# Patient Record
Sex: MALE | Race: WHITE
[De-identification: names, ages, dates, MRNs, and addresses within clinical notes are randomized per-mention and may not be internally consistent; named-entity substitution may affect disease eponyms.]

---

## 2022-06-13 NOTE — PROGRESS NOTES
Valerie Mayorga (:  1990) is a 29 y.o. male,New patient, here for evaluation of the following chief complaint(s):  Establish Care, Rash (spots on legs. No itching. ), and Psoriasis (would like Rx for cream to help with this. )         ASSESSMENT/PLAN:  1. Psoriasis  -     augmented betamethasone dipropionate (DIPROLENE AF) 0.05 % cream; Apply topically 2 times daily Apply topically 2 times daily. , Topical, 2 TIMES DAILY Starting Mon 2022, Disp-60 g, R-3, Normal  2. Need for hepatitis C screening test  -     Hepatitis C Antibody; Future  3. Encounter for screening for HIV  -     HIV 1/2 Ag/Ab, 4TH Generation,W Rflx Confirm; Future  4. Screening, ischemic heart disease  -     CBC with Auto Differential; Future  -     Comprehensive Metabolic Panel; Future  -     Lipid Panel; Future  -     Urinalysis with Reflex to Culture; Future  5. Screening for thyroid disorder  -     TSH; Future  6. Screening for diabetes mellitus  -     Comprehensive Metabolic Panel; Future  -     Hemoglobin A1C; Future  7. Chronic diarrhea  -     Culture, Stool; Future  -     Clostridium Difficile Toxin/Antigen; Future  -     Gastrointestinal Panel, Molecular; Future  -     Ova and Parasite Examination; Future  8. Hemorrhoids, unspecified hemorrhoid type  -     hydrocortisone (ANUSOL-HC) 2.5 % CREA rectal cream; Place rectally 2 times daily, Rectal, 2 TIMES DAILY Starting Mon 2022, Disp-1 each, R-1, Normal  9. Postprandial RUQ pain  -     US GALLBLADDER RUQ; Future  10. Intrinsic atopic dermatitis  -     augmented betamethasone dipropionate (DIPROLENE AF) 0.05 % cream; Apply topically 2 times daily Apply topically 2 times daily. , Topical, 2 TIMES DAILY Starting Mon 2022, Disp-60 g, R-3, Normal  11. Marijuana use  1. Psoriasis/atopic dermatitis. Patient given topical steroids. Not severe enough to be referred to dermatologist.  2.  Chronic diarrhea with right upper quadrant pain. Will do ultrasound of gallbladder.   We will do stool studies. 3.  Hemorrhoids. Given Anusol HC. 4.  Recreational marijuana use. 5.  Patient scheduled for blood work. Return in about 3 weeks (around 7/11/2022). Subjective   SUBJECTIVE/OBJECTIVE:  82-year-old male comes in to establish. Patient complains of  1. Rash on legs no itching. Eczematous rash on lower extremities with scaling. 2.  Psoriasis. Requesting cream for this. Bothers every couple of months. No lesion present time. 3.  History of bipolar depression. Not taking any meds. Doing well at the present time. 4.  History of alcohol and drug abuse. use marjuana recreational. Hx of opiates and alcohol abuse. At present not taking any opiates. Only drinks alcohol 3 times a year. 5. Complains of stomach issues. Has hemorrhoids. Diarrhea once a day. occasional blood from hemorrhoids. Some cramping. No food association. Patient says diarrhea switched with greasy foods. Has pain in the right upper quadrant. Review of Systems   Gastrointestinal: Positive for abdominal pain, anal bleeding and diarrhea. Objective   Physical Exam  Constitutional:       Appearance: Normal appearance. He is normal weight. Cardiovascular:      Rate and Rhythm: Normal rate and regular rhythm. Heart sounds: Normal heart sounds. Pulmonary:      Effort: Pulmonary effort is normal.      Breath sounds: Normal breath sounds. Abdominal:      General: Abdomen is flat. Bowel sounds are normal.      Palpations: Abdomen is soft. Tenderness: There is abdominal tenderness. Comments: ruq tenderness   Musculoskeletal:      Right lower leg: No edema. Left lower leg: No edema. Skin:     Comments: Atopic dermatitis noted on legs   Neurological:      General: No focal deficit present. Mental Status: He is alert. Psychiatric:         Mood and Affect: Mood normal.                  An electronic signature was used to authenticate this note.     --Jacki Lam MD

## 2022-06-20 ENCOUNTER — OFFICE VISIT (OUTPATIENT)
Dept: INTERNAL MEDICINE CLINIC | Facility: CLINIC | Age: 32
End: 2022-06-20
Payer: COMMERCIAL

## 2022-06-20 VITALS
TEMPERATURE: 97.7 F | SYSTOLIC BLOOD PRESSURE: 115 MMHG | HEART RATE: 71 BPM | WEIGHT: 131 LBS | HEIGHT: 67 IN | DIASTOLIC BLOOD PRESSURE: 74 MMHG | BODY MASS INDEX: 20.56 KG/M2 | OXYGEN SATURATION: 98 %

## 2022-06-20 DIAGNOSIS — L40.9 PSORIASIS: Primary | ICD-10-CM

## 2022-06-20 DIAGNOSIS — F12.90 MARIJUANA USE: ICD-10-CM

## 2022-06-20 DIAGNOSIS — Z13.6 SCREENING, ISCHEMIC HEART DISEASE: ICD-10-CM

## 2022-06-20 DIAGNOSIS — K52.9 CHRONIC DIARRHEA: ICD-10-CM

## 2022-06-20 DIAGNOSIS — L20.84 INTRINSIC ATOPIC DERMATITIS: ICD-10-CM

## 2022-06-20 DIAGNOSIS — R10.11 POSTPRANDIAL RUQ PAIN: ICD-10-CM

## 2022-06-20 DIAGNOSIS — K64.9 HEMORRHOIDS, UNSPECIFIED HEMORRHOID TYPE: ICD-10-CM

## 2022-06-20 DIAGNOSIS — Z13.1 SCREENING FOR DIABETES MELLITUS: ICD-10-CM

## 2022-06-20 DIAGNOSIS — Z11.4 ENCOUNTER FOR SCREENING FOR HIV: ICD-10-CM

## 2022-06-20 DIAGNOSIS — Z13.29 SCREENING FOR THYROID DISORDER: ICD-10-CM

## 2022-06-20 DIAGNOSIS — Z11.59 NEED FOR HEPATITIS C SCREENING TEST: ICD-10-CM

## 2022-06-20 PROBLEM — K14.8 TONGUE LESION: Status: ACTIVE | Noted: 2021-10-22

## 2022-06-20 LAB
ALBUMIN SERPL-MCNC: 4.1 G/DL (ref 3.5–5)
ALBUMIN/GLOB SERPL: 1.5 {RATIO} (ref 1.2–3.5)
ALP SERPL-CCNC: 74 U/L (ref 50–136)
ALT SERPL-CCNC: 41 U/L (ref 12–65)
ANION GAP SERPL CALC-SCNC: 4 MMOL/L (ref 7–16)
APPEARANCE UR: CLEAR
AST SERPL-CCNC: 21 U/L (ref 15–37)
BACTERIA URNS QL MICRO: NEGATIVE /HPF
BASOPHILS # BLD: 0 K/UL (ref 0–0.2)
BASOPHILS NFR BLD: 1 % (ref 0–2)
BILIRUB SERPL-MCNC: 0.5 MG/DL (ref 0.2–1.1)
BILIRUB UR QL: NEGATIVE
BUN SERPL-MCNC: 15 MG/DL (ref 6–23)
CALCIUM SERPL-MCNC: 9.3 MG/DL (ref 8.3–10.4)
CASTS URNS QL MICRO: ABNORMAL /LPF
CHLORIDE SERPL-SCNC: 108 MMOL/L (ref 98–107)
CHOLEST SERPL-MCNC: 141 MG/DL
CO2 SERPL-SCNC: 29 MMOL/L (ref 21–32)
COLOR UR: ABNORMAL
CREAT SERPL-MCNC: 0.9 MG/DL (ref 0.8–1.5)
DIFFERENTIAL METHOD BLD: NORMAL
EOSINOPHIL # BLD: 0.2 K/UL (ref 0–0.8)
EOSINOPHIL NFR BLD: 2 % (ref 0.5–7.8)
EPI CELLS #/AREA URNS HPF: ABNORMAL /HPF
ERYTHROCYTE [DISTWIDTH] IN BLOOD BY AUTOMATED COUNT: 12.8 % (ref 11.9–14.6)
EST. AVERAGE GLUCOSE BLD GHB EST-MCNC: 100 MG/DL
GLOBULIN SER CALC-MCNC: 2.7 G/DL (ref 2.3–3.5)
GLUCOSE SERPL-MCNC: 83 MG/DL (ref 65–100)
GLUCOSE UR STRIP.AUTO-MCNC: NEGATIVE MG/DL
HBA1C MFR BLD: 5.1 % (ref 4.2–6.3)
HCT VFR BLD AUTO: 45.6 % (ref 41.1–50.3)
HCV AB SER QL: NONREACTIVE
HDLC SERPL-MCNC: 48 MG/DL (ref 40–60)
HDLC SERPL: 2.9 {RATIO}
HGB BLD-MCNC: 14.7 G/DL (ref 13.6–17.2)
HGB UR QL STRIP: NEGATIVE
HIV 1+2 AB+HIV1 P24 AG SERPL QL IA: NONREACTIVE
HIV 1/2 RESULT COMMENT: NORMAL
IMM GRANULOCYTES # BLD AUTO: 0 K/UL (ref 0–0.5)
IMM GRANULOCYTES NFR BLD AUTO: 0 % (ref 0–5)
KETONES UR QL STRIP.AUTO: NEGATIVE MG/DL
LDLC SERPL CALC-MCNC: 61.2 MG/DL
LEUKOCYTE ESTERASE UR QL STRIP.AUTO: NEGATIVE
LYMPHOCYTES # BLD: 1.8 K/UL (ref 0.5–4.6)
LYMPHOCYTES NFR BLD: 29 % (ref 13–44)
MCH RBC QN AUTO: 31 PG (ref 26.1–32.9)
MCHC RBC AUTO-ENTMCNC: 32.2 G/DL (ref 31.4–35)
MCV RBC AUTO: 96.2 FL (ref 79.6–97.8)
MONOCYTES # BLD: 0.6 K/UL (ref 0.1–1.3)
MONOCYTES NFR BLD: 9 % (ref 4–12)
MUCOUS THREADS URNS QL MICRO: 0 /LPF
NEUTS SEG # BLD: 3.7 K/UL (ref 1.7–8.2)
NEUTS SEG NFR BLD: 59 % (ref 43–78)
NITRITE UR QL STRIP.AUTO: NEGATIVE
NRBC # BLD: 0 K/UL (ref 0–0.2)
PH UR STRIP: 5.5 [PH] (ref 5–9)
PLATELET # BLD AUTO: 235 K/UL (ref 150–450)
PMV BLD AUTO: 10.8 FL (ref 9.4–12.3)
POTASSIUM SERPL-SCNC: 4.4 MMOL/L (ref 3.5–5.1)
PROT SERPL-MCNC: 6.8 G/DL (ref 6.3–8.2)
PROT UR STRIP-MCNC: NEGATIVE MG/DL
RBC # BLD AUTO: 4.74 M/UL (ref 4.23–5.6)
RBC #/AREA URNS HPF: ABNORMAL /HPF
SODIUM SERPL-SCNC: 141 MMOL/L (ref 136–145)
SP GR UR REFRACTOMETRY: 1.02 (ref 1–1.02)
TRIGL SERPL-MCNC: 159 MG/DL (ref 35–150)
TSH, 3RD GENERATION: 1.69 UIU/ML (ref 0.36–3.74)
URINE CULTURE IF INDICATED: ABNORMAL
UROBILINOGEN UR QL STRIP.AUTO: 0.2 EU/DL (ref 0.2–1)
VLDLC SERPL CALC-MCNC: 31.8 MG/DL (ref 6–23)
WBC # BLD AUTO: 6.3 K/UL (ref 4.3–11.1)
WBC URNS QL MICRO: ABNORMAL /HPF

## 2022-06-20 PROCEDURE — 99204 OFFICE O/P NEW MOD 45 MIN: CPT | Performed by: FAMILY MEDICINE

## 2022-06-20 RX ORDER — BETAMETHASONE DIPROPIONATE 0.5 MG/G
CREAM TOPICAL 2 TIMES DAILY
Qty: 60 G | Refills: 3 | Status: SHIPPED | OUTPATIENT
Start: 2022-06-20

## 2022-06-20 RX ORDER — HYDROCORTISONE 25 MG/G
CREAM TOPICAL 2 TIMES DAILY
Qty: 1 EACH | Refills: 1 | Status: SHIPPED | OUTPATIENT
Start: 2022-06-20

## 2022-06-20 ASSESSMENT — ENCOUNTER SYMPTOMS
ANAL BLEEDING: 1
ABDOMINAL PAIN: 1
DIARRHEA: 1

## 2022-06-20 ASSESSMENT — PATIENT HEALTH QUESTIONNAIRE - PHQ9
SUM OF ALL RESPONSES TO PHQ QUESTIONS 1-9: 0
SUM OF ALL RESPONSES TO PHQ QUESTIONS 1-9: 0
SUM OF ALL RESPONSES TO PHQ9 QUESTIONS 1 & 2: 0
SUM OF ALL RESPONSES TO PHQ QUESTIONS 1-9: 0
2. FEELING DOWN, DEPRESSED OR HOPELESS: 0
1. LITTLE INTEREST OR PLEASURE IN DOING THINGS: 0
SUM OF ALL RESPONSES TO PHQ QUESTIONS 1-9: 0

## 2022-06-20 NOTE — PROGRESS NOTES
SUBJECTIVE:   Jaime Bingham is a 29 y.o. male presenting for his annual checkup. 68-year-old male comes in for CPE. Patient complains of  1. Rash on legs no itching. Eczematous rash on lower extremities with scaling. With improved with steroid cream.  2.  Psoriasis. Requesting cream for this. Bothers every couple of months. No lesion present time. 3.  History of bipolar depression. Doing well at the present time. 4.  History of alcohol and drug abuse. use marjuana recreational. Hx of opiates and alcohol abuse. At present not taking any opiates. Only drinks alcohol 3 times a year. 5. Complains of stomach issues. Has hemorrhoids. Diarrhea once a day. occasional blood from hemorrhoids. Some cramping. No food association. Patient says diarrhea worsewith greasy foods. Has pain in the right upper  And lower quadrant. Ultrasound of gallbladder ordered but has not yet been done. Stool studies are all negative. Will refer to gastroenterologist for evaluation. 6.  Patient complains of chronic urinary frequency. Has cut down on his caffeine intake. Urinalysis done was normal.  Sugars are normal.  Does drink a lot of fluids which can explain the urinary frequency. Will observe for now. 7.  Lab work done 6/29/22 stool test essentially normal.  Urinalysis normal.  HIV negative CBC normal CMP essentially normal cholesterol 141 triglycerides 159 LDL 61 HDL 40 TSH normal hemoglobin A1c normal hepatitis C negative    Current Outpatient Medications   Medication Sig Dispense Refill    hydrocortisone (ANUSOL-HC) 2.5 % CREA rectal cream Place rectally 2 times daily 1 each 1    augmented betamethasone dipropionate (DIPROLENE AF) 0.05 % cream Apply topically 2 times daily Apply topically 2 times daily. 60 g 3     No current facility-administered medications for this visit. Allergies: Patient has no known allergies. ROS:  Feeling well. No dyspnea or chest pain on exertion.  No abdominal pain, change in bowel habits, black or bloody stools. No urinary tract or prostatic symptoms. No neurological complaints. OBJECTIVE:   The patient appears well, alert, oriented x 3, in no distress. /81   Pulse 64   Temp 97.7 °F (36.5 °C) (Temporal)   Ht 5' 7\" (1.702 m)   Wt 134 lb 3.2 oz (60.9 kg)   SpO2 99%   BMI 21.02 kg/m²   ENT normal.  Neck supple. No adenopathy or thyromegaly. NETO. Lungs are clear, good air entry, no wheezes, rhonchi or rales. S1 and S2 normal, no murmurs, regular rate and rhythm. Abdomen is soft without tenderness, guarding, mass or organomegaly.  exam: no penile lesions or discharge, no testicular masses or tenderness, no hernias, HERNIA EXAM: no hernias found on exam, TESTICULAR EXAM: normal, no masses, PENIS: normal without lesions or discharge, SCROTUM: normal, no masses. Extremities show no edema, normal peripheral pulses. Neurological is normal without focal findings. ASSESSMENT:   healthy adult male    PLAN:   return for routine annual checkups        ICD-10-CM    1. Annual physical exam  Z00.00    2. Need for Tdap vaccination  Z23 Tdap (ADACEL) 5-2-15.5 LF-MCG/0.5 injection   3. Chronic diarrhea  K52.9 [facility]   4. Hemorrhoids, unspecified hemorrhoid type  K64.9 [facility]   5. Hypertriglyceridemia  E78.1 Comprehensive Metabolic Panel     Lipid Panel   6. Marijuana use  F12.90    7. Postprandial RUQ pain  R10.11    8. Urinary frequency  R35.0 Urinalysis with Reflex to Culture   1. Annual physical.  Given prescription for Tdap vaccine. 2.  Chronic diarrhea. Stool studies normal.  Will refer to gastroenterology. 3.  Hemorrhoids. We will do Epson salts hot sitz bath. Will ask evaluation from gastroenterology. 4.  Hypertriglyceridemia advised fish oil. Will follow. 5.  Marijuana use stable. 6.  Right upper quadrant pain. Await ultrasound. 7.  Urinary frequency.   Urinalysis normal.  Sugars normal.  Will observe for now.  Follow-up 6 months labs prior

## 2022-06-29 DIAGNOSIS — K52.9 CHRONIC DIARRHEA: ICD-10-CM

## 2022-06-30 LAB
C DIFF GDH STL QL: NORMAL
C DIFF TOX A+B STL QL IA: NORMAL
C DIFF TOXIN INTERPRETATION: NORMAL
CLINICAL CONSIDERATION: NORMAL
REFLEX: NORMAL

## 2022-07-02 LAB

## 2022-07-07 LAB
BACTERIA SPEC CULT: NORMAL
O+P SPEC MICRO: NORMAL
O+P STL CONC: NORMAL
SERVICE CMNT-IMP: NORMAL
SPECIMEN SOURCE: NORMAL

## 2022-07-13 ENCOUNTER — OFFICE VISIT (OUTPATIENT)
Dept: INTERNAL MEDICINE CLINIC | Facility: CLINIC | Age: 32
End: 2022-07-13
Payer: COMMERCIAL

## 2022-07-13 VITALS
TEMPERATURE: 97.7 F | WEIGHT: 134.2 LBS | HEART RATE: 64 BPM | DIASTOLIC BLOOD PRESSURE: 81 MMHG | BODY MASS INDEX: 21.06 KG/M2 | SYSTOLIC BLOOD PRESSURE: 126 MMHG | HEIGHT: 67 IN | OXYGEN SATURATION: 99 %

## 2022-07-13 DIAGNOSIS — K52.9 CHRONIC DIARRHEA: ICD-10-CM

## 2022-07-13 DIAGNOSIS — K64.9 HEMORRHOIDS, UNSPECIFIED HEMORRHOID TYPE: ICD-10-CM

## 2022-07-13 DIAGNOSIS — R10.11 POSTPRANDIAL RUQ PAIN: ICD-10-CM

## 2022-07-13 DIAGNOSIS — R35.0 URINARY FREQUENCY: ICD-10-CM

## 2022-07-13 DIAGNOSIS — Z23 NEED FOR TDAP VACCINATION: ICD-10-CM

## 2022-07-13 DIAGNOSIS — Z00.00 ANNUAL PHYSICAL EXAM: Primary | ICD-10-CM

## 2022-07-13 DIAGNOSIS — E78.1 HYPERTRIGLYCERIDEMIA: ICD-10-CM

## 2022-07-13 DIAGNOSIS — F12.90 MARIJUANA USE: ICD-10-CM

## 2022-07-13 PROCEDURE — 99395 PREV VISIT EST AGE 18-39: CPT | Performed by: FAMILY MEDICINE

## 2022-07-13 NOTE — PATIENT INSTRUCTIONS
Patient Education        Hemorrhoids: Care Instructions  Overview     Hemorrhoids are swollen veins that develop in the anal canal. Bleeding during bowel movements, itching, and rectal pain are the most common symptoms. Hemorrhoids can be uncomfortable at times, but rarely are they a seriousproblem. Most of the time, you can treat them with simple changes to your diet and bowel habits. These changes include eating more fiber and not straining to pass stools. Most hemorrhoids don't need surgery or other treatment unless they arevery large and painful or bleed a lot. Follow-up care is a key part of your treatment and safety. Be sure to make and go to all appointments, and call your doctor if you are having problems. It's also a good idea to know your test results and keep alist of the medicines you take. How can you care for yourself at home?  Sit in a few inches of warm water (sitz bath) 3 times a day and after bowel movements. The warm water helps with pain and itching.  Put ice on your anal area several times a day for 10 minutes at a time. Put a thin cloth between the ice and your skin. Follow this by placing a warm, wet towel on the area for another 10 to 20 minutes.  Take pain medicines exactly as directed. ? If the doctor gave you a prescription medicine for pain, take it as prescribed. ? If you are not taking a prescription pain medicine, ask your doctor if you can take an over-the-counter medicine.  Keep the anal area clean, but be gentle. Use water and a fragrance-free soap, or use baby wipes or medicated pads such as Tucks.  Wear cotton underwear and loose clothing to decrease moisture in the anal area.  Eat more fiber. Include foods such as whole-grain breads and cereals, raw vegetables, raw and dried fruits, and beans.  Drink plenty of fluids.  If you have kidney, heart, or liver disease and have to limit fluids, talk with your doctor before you increase the amount of fluids you drink.   Use a stool softener that contains bran or psyllium. You can save money by buying bran or psyllium (available in bulk at most health food stores) and sprinkling it on foods or stirring it into fruit juice. Or you can use a product such as Metamucil or Hydrocil.  Practice healthy bowel habits. ? Go to the bathroom as soon as you have the urge. ? Avoid straining to pass stools. Relax and give yourself time to let things happen naturally. ? Do not hold your breath while passing stools. ? Do not read while sitting on the toilet. Get off the toilet as soon as you have finished.  Take your medicines exactly as prescribed. Call your doctor if you think you are having a problem with your medicine. When should you call for help? Call 911 anytime you think you may need emergency care. For example, call if:     You pass maroon or very bloody stools. Call your doctor now or seek immediate medical care if:     You have increased pain.      You have increased bleeding. Watch closely for changes in your health, and be sure to contact your doctor if:     Your symptoms have not improved after 3 or 4 days. Where can you learn more? Go to https://Versuspe15MinutesNOW.AppCard. org and sign in to your [employer] account. Enter D622 in the [employer] box to learn more about \"Hemorrhoids: Care Instructions. \"     If you do not have an account, please click on the \"Sign Up Now\" link. Current as of: September 8, 2021               Content Version: 13.3  © 2006-2022 Healthwise, Incorporated. Care instructions adapted under license by [facility] ([facility]). If you have questions about a medical condition or this instruction, always ask your healthcare professional. [place] [address] any warranty or liability for your use of this information.

## 2023-01-03 DIAGNOSIS — R35.0 URINARY FREQUENCY: ICD-10-CM

## 2023-01-03 DIAGNOSIS — E78.1 HYPERTRIGLYCERIDEMIA: ICD-10-CM

## 2023-01-03 LAB
ALBUMIN SERPL-MCNC: 3.5 G/DL (ref 3.5–5)
ALBUMIN/GLOB SERPL: 1.2 (ref 0.4–1.6)
ALP SERPL-CCNC: 118 U/L (ref 50–136)
ALT SERPL-CCNC: 101 U/L (ref 12–65)
ANION GAP SERPL CALC-SCNC: 5 MMOL/L (ref 2–11)
APPEARANCE UR: CLEAR
AST SERPL-CCNC: 30 U/L (ref 15–37)
BACTERIA URNS QL MICRO: NEGATIVE /HPF
BILIRUB SERPL-MCNC: 0.3 MG/DL (ref 0.2–1.1)
BILIRUB UR QL: NEGATIVE
BUN SERPL-MCNC: 20 MG/DL (ref 6–23)
CALCIUM SERPL-MCNC: 8.9 MG/DL (ref 8.3–10.4)
CASTS URNS QL MICRO: NORMAL /LPF (ref 0–2)
CHLORIDE SERPL-SCNC: 105 MMOL/L (ref 101–110)
CHOLEST SERPL-MCNC: 208 MG/DL
CO2 SERPL-SCNC: 32 MMOL/L (ref 21–32)
COLOR UR: NORMAL
CREAT SERPL-MCNC: 1 MG/DL (ref 0.8–1.5)
EPI CELLS #/AREA URNS HPF: NORMAL /HPF (ref 0–5)
GLOBULIN SER CALC-MCNC: 3 G/DL (ref 2.8–4.5)
GLUCOSE SERPL-MCNC: 80 MG/DL (ref 65–100)
GLUCOSE UR STRIP.AUTO-MCNC: NEGATIVE MG/DL
HDLC SERPL-MCNC: 62 MG/DL (ref 40–60)
HDLC SERPL: 3.4
HGB UR QL STRIP: NEGATIVE
KETONES UR QL STRIP.AUTO: NEGATIVE MG/DL
LDLC SERPL CALC-MCNC: 94.6 MG/DL
LEUKOCYTE ESTERASE UR QL STRIP.AUTO: NEGATIVE
MUCOUS THREADS URNS QL MICRO: 0 /LPF
NITRITE UR QL STRIP.AUTO: NEGATIVE
PH UR STRIP: 6 (ref 5–9)
POTASSIUM SERPL-SCNC: 4.3 MMOL/L (ref 3.5–5.1)
PROT SERPL-MCNC: 6.5 G/DL (ref 6.3–8.2)
PROT UR STRIP-MCNC: NEGATIVE MG/DL
RBC #/AREA URNS HPF: NORMAL /HPF (ref 0–5)
SODIUM SERPL-SCNC: 142 MMOL/L (ref 133–143)
SP GR UR REFRACTOMETRY: 1.02 (ref 1–1.02)
TRIGL SERPL-MCNC: 257 MG/DL (ref 35–150)
URINE CULTURE IF INDICATED: NORMAL
UROBILINOGEN UR QL STRIP.AUTO: 0.2 EU/DL (ref 0.2–1)
VLDLC SERPL CALC-MCNC: 51.4 MG/DL (ref 6–23)
WBC URNS QL MICRO: NORMAL /HPF (ref 0–4)

## 2023-01-10 NOTE — PROGRESS NOTES
Galen Norwood (:  1990) is a 29 y.o. male,Established patient, here for evaluation of the following chief complaint(s):  6 Month Follow-Up         ASSESSMENT/PLAN:  1. Deviated nasal septum  -     mometasone (NASONEX) 50 MCG/ACT nasal spray; 2 sprays by Each Nostril route daily, Each Nostril, DAILY Starting Mon 2023, Disp-1 each, R-5, Normal  -     [address] [facility], [place]  2. Hemorrhoids, unspecified hemorrhoid type  -     hydrocortisone (ANUSOL-HC) 2.5 % CREA rectal cream; Place rectally 2 times daily, Rectal, 2 TIMES DAILY Starting Mon 2023, Disp-1 each, R-1, Normal  3. Hypertriglyceridemia  4. Allergic rhinitis, unspecified seasonality, unspecified trigger  -     mometasone (NASONEX) 50 MCG/ACT nasal spray; 2 sprays by Each Nostril route daily, Each Nostril, DAILY Starting Mon 2023, Disp-1 each, R-5, Normal  1. Deviated nasal septum. We will change Flonase to Nasonex. We will refer the patient to ENT. 2.  Hemorrhoids. Continue Anusol HC. 3.  Hypertriglyceridemia. We will continue to follow. Advised fish oil over-the-counter. 4.  Allergic rhinitis. Continue Zyrtec. Flonase changed to Nasonex. Return in about 6 months (around 2023) for ffup with labs prior to visit. Subjective   SUBJECTIVE/OBJECTIVE:  63-year-old male comes in for 6 month ffup  Patient complains of  1. Rash on legs no itching. Eczematous rash on lower extremities with scaling. improved with steroid cream.  2.  Psoriasis. Requesting cream for this. Bothers every couple of months. No lesion present time. 3.  History of bipolar depression. Doing well at the present time. Not on any medications. 4.  History of alcohol and drug abuse. Hx of opiates and alcohol abuse. At present not taking any opiates. Only drinks alcohol 3 times a year. Patient says he has stopped using marijuana at this time. 5. Complains of stomach issues. Has hemorrhoids. Diarrhea and abdominal pain better.  After going on low fat diet. Does not desire treatment for hemorrhoids. No constipation. Patient never had ultrasound of right upper quadrant done. Patient also did not go to the gastroenterologist.  Since not bothering him at this time we will hold off any further work-up. 6.   Hx of deviated nasal septum. Always sneezing and coughing. Patient taking Zyrtec and Flonase nasal spray. Has some bleeding noted. We will change Flonase to Nasonex. We will also refer to ENT for deviated nasal septum. Patient was already of surgery because of history of opioid abuse. 7. Low cholesterol diet. Since last visit. LDL normal.  HDL increased.   Triglycerides 267.  8. Lab work done 6/29/22 stool test essentially normal.  Urinalysis normal.  HIV negative CBC normal CMP essentially normal cholesterol 141 triglycerides 159 LDL 61 HDL 40 TSH normal hemoglobin A1c normal hepatitis C negative  Lab work done 1/3/2023 cholesterol 208 LDL 94 triglycerides 257  GFR normal urinalysis normal    Orders Only on 01/03/2023   Component Date Value Ref Range Status    Cholesterol, Total 01/03/2023 208 (A)  <200 MG/DL Final    Comment: Borderline High: 200-239 mg/dL  High: Greater than or equal to 240 mg/dL      Triglycerides 01/03/2023 257 (A)  35 - 150 MG/DL Final    Comment: Borderline High: 150-199 mg/dL, High: 200-499 mg/dL  Very High: Greater than or equal to 500 mg/dL      HDL 01/03/2023 62 (A)  40 - 60 MG/DL Final    LDL Calculated 01/03/2023 94.6  <100 MG/DL Final    Comment: Near Optimal: 100-129 mg/dL  Borderline High: 130-159, High: 160-189 mg/dL  Very High: Greater than or equal to 190 mg/dL      VLDL Cholesterol Calculated 01/03/2023 51.4 (A)  6.0 - 23.0 MG/DL Final    Chol/HDL Ratio 01/03/2023 3.4    Final    Sodium 01/03/2023 142  133 - 143 mmol/L Final    Potassium 01/03/2023 4.3  3.5 - 5.1 mmol/L Final    Chloride 01/03/2023 105  101 - 110 mmol/L Final    CO2 01/03/2023 32  21 - 32 mmol/L Final    Anion Gap 01/03/2023 5 2 - 11 mmol/L Final    Glucose 01/03/2023 80  65 - 100 mg/dL Final    BUN 01/03/2023 20  6 - 23 MG/DL Final    Creatinine 01/03/2023 1.00  0.8 - 1.5 MG/DL Final    Est, Glom Filt Rate 01/03/2023 >60  >60 ml/min/1.73m2 Final    Comment:   Pediatric calculator link: [employer].at. org/professionals/kdoqi/gfr_calculatorped    These results are not intended for use in patients <25years of age. eGFR results are calculated without a race factor using  the 2021 CKD-EPI equation. Careful clinical correlation is recommended, particularly when comparing to results calculated using previous equations. The CKD-EPI equation is less accurate in patients with extremes of muscle mass, extra-renal metabolism of creatinine, excessive creatine ingestion, or following therapy that affects renal tubular secretion.       Calcium 01/03/2023 8.9  8.3 - 10.4 MG/DL Final    Total Bilirubin 01/03/2023 0.3  0.2 - 1.1 MG/DL Final    ALT 01/03/2023 101 (A)  12 - 65 U/L Final    AST 01/03/2023 30  15 - 37 U/L Final    Alk Phosphatase 01/03/2023 118  50 - 136 U/L Final    Total Protein 01/03/2023 6.5  6.3 - 8.2 g/dL Final    Albumin 01/03/2023 3.5  3.5 - 5.0 g/dL Final    Globulin 01/03/2023 3.0  2.8 - 4.5 g/dL Final    Albumin/Globulin Ratio 01/03/2023 1.2  0.4 - 1.6   Final    Color, UA 01/03/2023 YELLOW/STRAW    Final    Color Reference Range: Straw, Yellow or Dark Yellow    Appearance 01/03/2023 CLEAR    Final    Specific Gravity, UA 01/03/2023 1.021  1.001 - 1.023   Final    pH, Urine 01/03/2023 6.0  5.0 - 9.0   Final    Protein, UA 01/03/2023 Negative  Negative mg/dL Final    Glucose, UA 01/03/2023 Negative  mg/dL Final    Ketones, Urine 01/03/2023 Negative  Negative mg/dL Final    Bilirubin Urine 01/03/2023 Negative  Negative   Final    Blood, Urine 01/03/2023 Negative  Negative   Final    Urobilinogen, Urine 01/03/2023 0.2  0.2 - 1.0 EU/dL Final    Nitrite, Urine 01/03/2023 Negative  Negative   Final    Leukocyte Esterase, Urine 01/03/2023 Negative  Negative   Final    Urine Culture if Indicated 01/03/2023 CULTURE NOT INDICATED BY UA RESULT    Final    WBC, UA 01/03/2023 0-4  0 - 4 /hpf Final    RBC, UA 01/03/2023 0-5  0 - 5 /hpf Final    BACTERIA, URINE 01/03/2023 Negative  Negative /hpf Final    Epithelial Cells UA 01/03/2023 0-5  0 - 5 /hpf Final    Casts 01/03/2023 0-2  0 - 2 /lpf Final    Mucus, UA 01/03/2023 0  0 /lpf Final       Review of Systems       Objective   Physical Exam  Constitutional:       Appearance: Normal appearance. HENT:      Head: Normocephalic. Right Ear: Tympanic membrane, ear canal and external ear normal.      Left Ear: Tympanic membrane, ear canal and external ear normal.      Nose:      Comments: Deviated nasal septum to the left  Neck:      Vascular: No carotid bruit. Cardiovascular:      Rate and Rhythm: Normal rate and regular rhythm. Heart sounds: Normal heart sounds. Pulmonary:      Effort: Pulmonary effort is normal.      Breath sounds: Normal breath sounds. Abdominal:      General: Abdomen is flat. Bowel sounds are normal.      Palpations: Abdomen is soft. Musculoskeletal:      Cervical back: No tenderness. Right lower leg: No edema. Left lower leg: No edema. Lymphadenopathy:      Cervical: No cervical adenopathy. Neurological:      Mental Status: He is alert. Psychiatric:         Mood and Affect: Mood normal.                An electronic signature was used to authenticate this note.     --Vish Salcedo MD

## 2023-01-16 ENCOUNTER — OFFICE VISIT (OUTPATIENT)
Dept: INTERNAL MEDICINE CLINIC | Facility: CLINIC | Age: 33
End: 2023-01-16
Payer: COMMERCIAL

## 2023-01-16 VITALS
HEART RATE: 78 BPM | BODY MASS INDEX: 22.6 KG/M2 | SYSTOLIC BLOOD PRESSURE: 130 MMHG | HEIGHT: 67 IN | DIASTOLIC BLOOD PRESSURE: 82 MMHG | WEIGHT: 144 LBS | OXYGEN SATURATION: 98 %

## 2023-01-16 DIAGNOSIS — E78.1 HYPERTRIGLYCERIDEMIA: ICD-10-CM

## 2023-01-16 DIAGNOSIS — K64.9 HEMORRHOIDS, UNSPECIFIED HEMORRHOID TYPE: ICD-10-CM

## 2023-01-16 DIAGNOSIS — J34.2 DEVIATED NASAL SEPTUM: Primary | ICD-10-CM

## 2023-01-16 DIAGNOSIS — J30.9 ALLERGIC RHINITIS, UNSPECIFIED SEASONALITY, UNSPECIFIED TRIGGER: ICD-10-CM

## 2023-01-16 PROBLEM — F12.90 MARIJUANA USE: Status: RESOLVED | Noted: 2022-07-13 | Resolved: 2023-01-16

## 2023-01-16 PROCEDURE — 99214 OFFICE O/P EST MOD 30 MIN: CPT | Performed by: FAMILY MEDICINE

## 2023-01-16 RX ORDER — MOMETASONE FUROATE 50 UG/1
2 SPRAY, METERED NASAL DAILY
Qty: 1 EACH | Refills: 5 | Status: SHIPPED | OUTPATIENT
Start: 2023-01-16

## 2023-01-16 RX ORDER — HYDROCORTISONE 25 MG/G
CREAM TOPICAL 2 TIMES DAILY
Qty: 1 EACH | Refills: 1 | Status: SHIPPED | OUTPATIENT
Start: 2023-01-16

## 2023-01-16 RX ORDER — ALBUTEROL SULFATE 90 UG/1
AEROSOL, METERED RESPIRATORY (INHALATION)
COMMUNITY
Start: 2022-12-28

## 2023-01-16 SDOH — ECONOMIC STABILITY: FOOD INSECURITY: WITHIN THE PAST 12 MONTHS, YOU WORRIED THAT YOUR FOOD WOULD RUN OUT BEFORE YOU GOT MONEY TO BUY MORE.: NEVER TRUE

## 2023-01-16 SDOH — ECONOMIC STABILITY: FOOD INSECURITY: WITHIN THE PAST 12 MONTHS, THE FOOD YOU BOUGHT JUST DIDN'T LAST AND YOU DIDN'T HAVE MONEY TO GET MORE.: NEVER TRUE

## 2023-01-16 ASSESSMENT — SOCIAL DETERMINANTS OF HEALTH (SDOH): HOW HARD IS IT FOR YOU TO PAY FOR THE VERY BASICS LIKE FOOD, HOUSING, MEDICAL CARE, AND HEATING?: NOT HARD AT ALL

## 2023-01-16 ASSESSMENT — PATIENT HEALTH QUESTIONNAIRE - PHQ9
1. LITTLE INTEREST OR PLEASURE IN DOING THINGS: 0
SUM OF ALL RESPONSES TO PHQ9 QUESTIONS 1 & 2: 0
SUM OF ALL RESPONSES TO PHQ QUESTIONS 1-9: 0
2. FEELING DOWN, DEPRESSED OR HOPELESS: 0
SUM OF ALL RESPONSES TO PHQ QUESTIONS 1-9: 0

## 2023-01-30 ENCOUNTER — OFFICE VISIT (OUTPATIENT)
Dept: ENT CLINIC | Age: 33
End: 2023-01-30
Payer: COMMERCIAL

## 2023-01-30 VITALS — BODY MASS INDEX: 22.7 KG/M2 | HEIGHT: 67 IN | HEART RATE: 86 BPM | WEIGHT: 144.6 LBS | OXYGEN SATURATION: 97 %

## 2023-01-30 DIAGNOSIS — R04.0 RECURRENT EPISTAXIS: ICD-10-CM

## 2023-01-30 DIAGNOSIS — J34.89 NASAL OBSTRUCTION: Primary | ICD-10-CM

## 2023-01-30 DIAGNOSIS — J34.2 DEVIATED NASAL SEPTUM: ICD-10-CM

## 2023-01-30 DIAGNOSIS — J34.3 NASAL TURBINATE HYPERTROPHY: ICD-10-CM

## 2023-01-30 PROCEDURE — 99204 OFFICE O/P NEW MOD 45 MIN: CPT | Performed by: STUDENT IN AN ORGANIZED HEALTH CARE EDUCATION/TRAINING PROGRAM

## 2023-01-30 NOTE — PROGRESS NOTES
HPI:  Aristides Curiel. Raj Krishna is a 29 y.o. male seen New    Chief Complaint   Patient presents with    Nasal deviated septum     Patient presents today with c/o L sided septum deviation . Patient attributes this to break 10-12 years ago . Patient states that he has frequent nosebleeds , congestion , and headaches . Patient would like to discuss options. 43-year-old male seen as a new patient referral evaluation with long-term nasal obstruction. He describes having had an injury to his nose during martial arts approximately 12 years ago taken elbow to the right side of his nose and since that time he has had severe left-sided nasal airway obstruction. This is particularly worse when he is trying to do cardio exercises and increase snoring at night when he sleeps. For recently he has trialed Nasonex spray which has had a mild benefit. His symptoms are significantly worse on the left as compared to the right. There is also been some intermittent right-sided epistaxis worsening this year. Past Medical History, Past Surgical History, Family history, Social History, and Medications were all reviewed with the patient today and updated as necessary. Allergies   Allergen Reactions    Other Other (See Comments)     Opioids - Patient declines to take all opioids.         Patient Active Problem List   Diagnosis    Tongue lesion    Chronic diarrhea    Hemorrhoids    Hypertriglyceridemia    Postprandial RUQ pain    Deviated nasal septum    Allergic rhinitis       Current Outpatient Medications   Medication Sig    albuterol sulfate HFA (PROVENTIL;VENTOLIN;PROAIR) 108 (90 Base) MCG/ACT inhaler INHALE 2 PUFFS EVERY 4 HOURS AS NEEDED FOR WHEEZING OR SHORTNESS OF BREATH    hydrocortisone (ANUSOL-HC) 2.5 % CREA rectal cream Place rectally 2 times daily    mometasone (NASONEX) 50 MCG/ACT nasal spray 2 sprays by Each Nostril route daily    augmented betamethasone dipropionate (DIPROLENE AF) 0.05 % cream Apply topically 2 times daily Apply topically 2 times daily. No current facility-administered medications for this visit. Past Medical History:   Diagnosis Date    Alcohol abuse     Asthma     Depression     Drug abuse ([address]     Psychiatric disorder        Past Surgical History:   Procedure Laterality Date    HERNIA REPAIR      TESTICLE SURGERY         Social History     Tobacco Use    Smoking status: Former    Smokeless tobacco: Former   Substance Use Topics    Alcohol use: Yes     Comment: 2-3 beers a year       Family History   Problem Relation Age of Onset    Heart Disease Mother     Asthma Mother     High Blood Pressure Father     Drug Abuse Maternal Uncle     Dementia Maternal Grandmother     Cancer Maternal Grandfather     Alcohol Abuse Maternal Grandfather     Alcohol Abuse Paternal Grandfather     Dementia Paternal Grandfather         ROS:    Review of Systems        PHYSICAL EXAM:    Pulse 86   Ht 5' 7\" (1.702 m)   Wt 144 lb 9.6 oz (65.6 kg)   SpO2 97%   BMI 22.65 kg/m²     Physical Exam  Vitals and nursing note reviewed. Constitutional:       Appearance: Normal appearance. HENT:      Head: Normocephalic and atraumatic. Right Ear: Tympanic membrane, ear canal and external ear normal. There is no impacted cerumen. Left Ear: Tympanic membrane, ear canal and external ear normal. There is no impacted cerumen. Nose: Septal deviation present. No congestion or rhinorrhea. Right Turbinates: Enlarged. Left Turbinates: Enlarged. Comments: Significant left-sided DNS buckling deformity old chronic fracture site. Mouth/Throat:      Mouth: Mucous membranes are moist.      Pharynx: Oropharynx is clear. No oropharyngeal exudate or posterior oropharyngeal erythema. Eyes:      General: No scleral icterus. Pulmonary:      Effort: Pulmonary effort is normal.   Musculoskeletal:      Cervical back: Normal range of motion and neck supple. No rigidity.    Lymphadenopathy:      Cervical: No cervical adenopathy. Skin:     General: Skin is warm and dry. Neurological:      Mental Status: He is alert and oriented to person, place, and time. Psychiatric:         Mood and Affect: Mood normal.         Behavior: Behavior normal.        PROCEDURE: Nasal endoscopy  INDICATIONS: Nasal obstruction  DESCRIPTION: After verbal consent was obtained and a timeout was performed, the 0 degree rigid nasal endoscope was advanced into both nares. The septum was significantly deviated to the left with an old chronic fracture buckling deformity w/ no perforations. Compensatory nasal turbinate hypertrophy. Dryness to the right nasal septum maxillary crest at the fracture site. Evidence of recent epistaxis. There were no masses seen along the nasal floor or within the nasopharynx. On the R side, the mucosa was healthy and the turbinates were intact. The middle meatus was clear w/ no edema, polyps or mucopurulence and the sphenoethmoid recess was was clear. On the L side, the mucosa was healthy and the turbinates were intact. The middle meatus was clear w/ no edema, polyps or mucopurulence and the sphenoethmoid recess was was clear. The scope was then removed and the patient tolerated the procedure well w/ no complications. ASSESSMENT and PLAN        ICD-10-CM    1. Nasal obstruction  J34.89       2. Deviated nasal septum  J34.2       3. Nasal turbinate hypertrophy  J34.3       4. Recurrent epistaxis  R04.0           Patient had a rather severe left-sided DNS with a fracture nasal septum chronically. Significant anterior left-sided deviation in the floor of the right nasal cavity showing the maxillary crest which was dry and recent epistaxis evidence was present. We have discussed medical therapy options including his current regimen of daily Nasonex for which she could also add saline misting sprays or irrigations.   If symptoms continue to bother him from a nasal obstruction standpoint with activity or sleep then we have discussed a surgical intervention option to include septoplasty and submucous resection of inferior turbinates. He believes he would likely want to go forward with surgery and he will consider this and call us and keep us updated on his desires. For now he can continue with Nasonex saline sprays and oral antihistamines.     Jessi Son, DO  1/30/2023

## 2023-03-14 ENCOUNTER — TELEMEDICINE (OUTPATIENT)
Dept: INTERNAL MEDICINE CLINIC | Facility: CLINIC | Age: 33
End: 2023-03-14
Payer: COMMERCIAL

## 2023-03-14 DIAGNOSIS — J01.91 ACUTE RECURRENT SINUSITIS, UNSPECIFIED LOCATION: Primary | ICD-10-CM

## 2023-03-14 DIAGNOSIS — H10.31 ACUTE CONJUNCTIVITIS OF RIGHT EYE, UNSPECIFIED ACUTE CONJUNCTIVITIS TYPE: ICD-10-CM

## 2023-03-14 PROCEDURE — 99213 OFFICE O/P EST LOW 20 MIN: CPT | Performed by: FAMILY MEDICINE

## 2023-03-14 RX ORDER — PREDNISONE 20 MG/1
20 TABLET ORAL 2 TIMES DAILY
Qty: 10 TABLET | Refills: 0 | Status: SHIPPED | OUTPATIENT
Start: 2023-03-14 | End: 2023-03-19

## 2023-03-14 RX ORDER — AMOXICILLIN AND CLAVULANATE POTASSIUM 875; 125 MG/1; MG/1
1 TABLET, FILM COATED ORAL 2 TIMES DAILY
Qty: 20 TABLET | Refills: 0 | Status: SHIPPED | OUTPATIENT
Start: 2023-03-14 | End: 2023-03-24

## 2023-03-14 RX ORDER — POLYMYXIN B SULFATE AND TRIMETHOPRIM 1; 10000 MG/ML; [USP'U]/ML
1 SOLUTION OPHTHALMIC EVERY 6 HOURS
Qty: 1 EACH | Refills: 0 | Status: SHIPPED | OUTPATIENT
Start: 2023-03-14 | End: 2023-03-24

## 2023-03-14 SDOH — ECONOMIC STABILITY: FOOD INSECURITY: WITHIN THE PAST 12 MONTHS, YOU WORRIED THAT YOUR FOOD WOULD RUN OUT BEFORE YOU GOT MONEY TO BUY MORE.: PATIENT DECLINED

## 2023-03-14 SDOH — ECONOMIC STABILITY: HOUSING INSECURITY
IN THE LAST 12 MONTHS, WAS THERE A TIME WHEN YOU DID NOT HAVE A STEADY PLACE TO SLEEP OR SLEPT IN A SHELTER (INCLUDING NOW)?: PATIENT REFUSED

## 2023-03-14 SDOH — ECONOMIC STABILITY: FOOD INSECURITY: WITHIN THE PAST 12 MONTHS, THE FOOD YOU BOUGHT JUST DIDN'T LAST AND YOU DIDN'T HAVE MONEY TO GET MORE.: PATIENT DECLINED

## 2023-03-14 SDOH — ECONOMIC STABILITY: INCOME INSECURITY: HOW HARD IS IT FOR YOU TO PAY FOR THE VERY BASICS LIKE FOOD, HOUSING, MEDICAL CARE, AND HEATING?: PATIENT DECLINED

## 2023-03-14 SDOH — ECONOMIC STABILITY: TRANSPORTATION INSECURITY
IN THE PAST 12 MONTHS, HAS LACK OF TRANSPORTATION KEPT YOU FROM MEETINGS, WORK, OR FROM GETTING THINGS NEEDED FOR DAILY LIVING?: PATIENT DECLINED

## 2023-03-14 ASSESSMENT — ENCOUNTER SYMPTOMS: COUGH: 1

## 2023-03-14 NOTE — PROGRESS NOTES
Cody Norwood (:  1990) is a Established patient, here for evaluation of the following:  Cough for 4  weeks. Had COVID and cough is still present. Sinus drainage and pinkeye on the right side. Assessment & Plan   Below is the assessment and plan developed based on review of pertinent history, physical exam, labs, studies, and medications. 1. Acute recurrent sinusitis, unspecified location  -     amoxicillin-clavulanate (AUGMENTIN) 875-125 MG per tablet; Take 1 tablet by mouth 2 times daily for 10 days, Disp-20 tablet, R-0Normal  -     predniSONE (DELTASONE) 20 MG tablet; Take 1 tablet by mouth 2 times daily for 5 days, Disp-10 tablet, R-0Normal  2. Acute conjunctivitis of right eye, unspecified acute conjunctivitis type  -     trimethoprim-polymyxin b (POLYTRIM) 70891-4.1 UNIT/ML-% ophthalmic solution; Place 1 drop into the right eye in the morning and 1 drop at noon and 1 drop in the evening and 1 drop before bedtime. Do all this for 10 days. , Disp-1 each, R-0Normal  Return if symptoms worsen or fail to improve. Subjective   29year-old male comes in because of cough of 4 weeks duration. Now has sinus drainage and pinkeye on the right side. Sinus drainage x 3 weeks. Greenish with blood. + sinus pain. Had fever and chills x1 day. R eye drainage with redness. Patient with history of septal deviation. Review of Systems   Respiratory:  Positive for cough.          Objective   Patient-Reported Vitals  No data recorded     Physical Exam  [INSTRUCTIONS:  \"[x]\" Indicates a positive item  \"[]\" Indicates a negative item  -- DELETE ALL ITEMS NOT EXAMINED]    Constitutional: [x] Appears well-developed and well-nourished [x] No apparent distress      [] Abnormal -     Mental status: [x] Alert and awake  [x] Oriented to person/place/time [x] Able to follow commands    [] Abnormal -     Eyes:   EOM    [x]  Normal    [] Abnormal -   Sclera  []  Normal    [x] Abnormal -reddish          Discharge []  None visible   [x] Abnormal - swollen right eyelid    HENT: [x] Normocephalic, atraumatic  [] Abnormal -   [] Mouth/Throat: Mucous membranes are moist    External Ears [x] Normal  [] Abnormal -    Neck: [x] No visualized mass [] Abnormal -     Pulmonary/Chest: [x] Respiratory effort normal   [x] No visualized signs of difficulty breathing or respiratory distress        [] Abnormal -      Musculoskeletal:   [] Normal gait with no signs of ataxia         [x] Normal range of motion of neck        [] Abnormal -     Neurological:        [x] No Facial Asymmetry (Cranial nerve 7 motor function) (limited exam due to video visit)          [x] No gaze palsy        [] Abnormal -          Skin:        [x] No significant exanthematous lesions or discoloration noted on facial skin         [] Abnormal -            Psychiatric:       [x] Normal Affect [] Abnormal -        [x] No Hallucinations    Other pertinent observable physical exam findings: Chalo Norwood, was evaluated through a synchronous (real-time) audio-video encounter. The patient (or guardian if applicable) is aware that this is a billable service, which includes applicable co-pays. This Virtual Visit was conducted with patient's (and/or legal guardian's) consent. The visit was conducted pursuant to the emergency declaration under the [facility] [address] authority and the [employer] General Act. Patient identification was verified, and a caregiver was present when appropriate.    The patient was located at Home: [address] [place] [id number]  Provider was located at [facility] ([address] [place], [place] [address]t): [place],  [address]         --Kenan Dobson MD

## 2023-07-13 DIAGNOSIS — Z13.1 SCREENING FOR DIABETES MELLITUS: ICD-10-CM

## 2023-07-13 DIAGNOSIS — Z13.6 SCREENING, ISCHEMIC HEART DISEASE: ICD-10-CM

## 2023-07-13 DIAGNOSIS — E78.1 HYPERTRIGLYCERIDEMIA: Primary | ICD-10-CM

## 2023-07-13 DIAGNOSIS — R35.0 URINARY FREQUENCY: ICD-10-CM

## 2023-07-13 DIAGNOSIS — Z13.29 SCREENING FOR THYROID DISORDER: ICD-10-CM

## 2023-07-13 DIAGNOSIS — J34.2 DEVIATED NASAL SEPTUM: ICD-10-CM

## 2023-07-13 DIAGNOSIS — E78.1 HYPERTRIGLYCERIDEMIA: ICD-10-CM

## 2023-07-13 DIAGNOSIS — J30.9 ALLERGIC RHINITIS, UNSPECIFIED SEASONALITY, UNSPECIFIED TRIGGER: ICD-10-CM

## 2023-07-13 DIAGNOSIS — K64.9 HEMORRHOIDS, UNSPECIFIED HEMORRHOID TYPE: ICD-10-CM

## 2023-07-13 LAB
ERYTHROCYTE [DISTWIDTH] IN BLOOD BY AUTOMATED COUNT: 12.8 % (ref 11.9–14.6)
HCT VFR BLD AUTO: 46.9 % (ref 41.1–50.3)
HGB BLD-MCNC: 15.6 G/DL (ref 13.6–17.2)
MCH RBC QN AUTO: 31.3 PG (ref 26.1–32.9)
MCHC RBC AUTO-ENTMCNC: 33.3 G/DL (ref 31.4–35)
MCV RBC AUTO: 94 FL (ref 82–102)
NRBC # BLD: 0 K/UL (ref 0–0.2)
PLATELET # BLD AUTO: 227 K/UL (ref 150–450)
PMV BLD AUTO: 10.5 FL (ref 9.4–12.3)
RBC # BLD AUTO: 4.99 M/UL (ref 4.23–5.6)
WBC # BLD AUTO: 5.2 K/UL (ref 4.3–11.1)

## 2023-07-13 RX ORDER — HYDROCORTISONE 25 MG/G
CREAM TOPICAL 2 TIMES DAILY
Qty: 1 EACH | Refills: 1 | Status: SHIPPED | OUTPATIENT
Start: 2023-07-13

## 2023-07-13 RX ORDER — MOMETASONE FUROATE 50 UG/1
2 SPRAY, METERED NASAL DAILY
Qty: 2 EACH | Refills: 0 | Status: SHIPPED | OUTPATIENT
Start: 2023-07-13

## 2023-07-14 LAB
ALBUMIN SERPL-MCNC: 3.9 G/DL (ref 3.5–5)
ALBUMIN/GLOB SERPL: 1.4 (ref 0.4–1.6)
ALP SERPL-CCNC: 103 U/L (ref 50–136)
ALT SERPL-CCNC: 51 U/L (ref 12–65)
ANION GAP SERPL CALC-SCNC: 8 MMOL/L (ref 2–11)
APPEARANCE UR: CLEAR
AST SERPL-CCNC: 30 U/L (ref 15–37)
BACTERIA URNS QL MICRO: NEGATIVE /HPF
BILIRUB SERPL-MCNC: 0.7 MG/DL (ref 0.2–1.1)
BILIRUB UR QL: NEGATIVE
BUN SERPL-MCNC: 16 MG/DL (ref 6–23)
CALCIUM SERPL-MCNC: 9.1 MG/DL (ref 8.3–10.4)
CASTS URNS QL MICRO: NORMAL /LPF (ref 0–2)
CHLORIDE SERPL-SCNC: 110 MMOL/L (ref 101–110)
CHOLEST SERPL-MCNC: 176 MG/DL
CO2 SERPL-SCNC: 26 MMOL/L (ref 21–32)
COLOR UR: NORMAL
CREAT SERPL-MCNC: 0.9 MG/DL (ref 0.8–1.5)
EPI CELLS #/AREA URNS HPF: NORMAL /HPF (ref 0–5)
EST. AVERAGE GLUCOSE BLD GHB EST-MCNC: 103 MG/DL
GLOBULIN SER CALC-MCNC: 2.7 G/DL (ref 2.8–4.5)
GLUCOSE SERPL-MCNC: 90 MG/DL (ref 65–100)
GLUCOSE UR STRIP.AUTO-MCNC: NEGATIVE MG/DL
HBA1C MFR BLD: 5.2 % (ref 4.8–5.6)
HDLC SERPL-MCNC: 48 MG/DL (ref 40–60)
HDLC SERPL: 3.7
HGB UR QL STRIP: NEGATIVE
KETONES UR QL STRIP.AUTO: NEGATIVE MG/DL
LDLC SERPL CALC-MCNC: 116 MG/DL
LEUKOCYTE ESTERASE UR QL STRIP.AUTO: NEGATIVE
MUCOUS THREADS URNS QL MICRO: 0 /LPF
NITRITE UR QL STRIP.AUTO: NEGATIVE
PH UR STRIP: 7.5 (ref 5–9)
POTASSIUM SERPL-SCNC: 4.5 MMOL/L (ref 3.5–5.1)
PROT SERPL-MCNC: 6.6 G/DL (ref 6.3–8.2)
PROT UR STRIP-MCNC: NEGATIVE MG/DL
RBC #/AREA URNS HPF: NORMAL /HPF (ref 0–5)
SODIUM SERPL-SCNC: 144 MMOL/L (ref 133–143)
SP GR UR REFRACTOMETRY: 1.02 (ref 1–1.02)
TRIGL SERPL-MCNC: 60 MG/DL (ref 35–150)
TSH, 3RD GENERATION: 1.29 UIU/ML (ref 0.36–3.74)
URINE CULTURE IF INDICATED: NORMAL
UROBILINOGEN UR QL STRIP.AUTO: 0.2 EU/DL (ref 0.2–1)
VLDLC SERPL CALC-MCNC: 12 MG/DL (ref 6–23)
WBC URNS QL MICRO: NORMAL /HPF (ref 0–4)

## 2023-07-20 ENCOUNTER — OFFICE VISIT (OUTPATIENT)
Dept: INTERNAL MEDICINE CLINIC | Facility: CLINIC | Age: 33
End: 2023-07-20
Payer: COMMERCIAL

## 2023-07-20 VITALS
WEIGHT: 147.6 LBS | DIASTOLIC BLOOD PRESSURE: 75 MMHG | SYSTOLIC BLOOD PRESSURE: 112 MMHG | BODY MASS INDEX: 23.17 KG/M2 | OXYGEN SATURATION: 97 % | HEART RATE: 71 BPM | HEIGHT: 67 IN

## 2023-07-20 DIAGNOSIS — J30.9 ALLERGIC RHINITIS, UNSPECIFIED SEASONALITY, UNSPECIFIED TRIGGER: ICD-10-CM

## 2023-07-20 DIAGNOSIS — E78.1 HYPERTRIGLYCERIDEMIA: Primary | ICD-10-CM

## 2023-07-20 DIAGNOSIS — Z23 NEED FOR TDAP VACCINATION: ICD-10-CM

## 2023-07-20 DIAGNOSIS — J34.2 DEVIATED NASAL SEPTUM: ICD-10-CM

## 2023-07-20 DIAGNOSIS — R10.13 DYSPEPSIA: ICD-10-CM

## 2023-07-20 PROCEDURE — 99214 OFFICE O/P EST MOD 30 MIN: CPT | Performed by: FAMILY MEDICINE

## 2023-07-20 RX ORDER — MOMETASONE FUROATE 50 UG/1
2 SPRAY, METERED NASAL DAILY
Qty: 2 EACH | Refills: 5 | Status: SHIPPED | OUTPATIENT
Start: 2023-07-20

## 2023-07-20 NOTE — PROGRESS NOTES
Lexus Norwood (:  1990) is a 34 y.o. male,Established patient, here for evaluation of the following chief complaint(s):  Follow-up (6 month lab review)         ASSESSMENT/PLAN:  1. Hypertriglyceridemia  2. Need for Tdap vaccination  -     Tdap (ADACEL) 5-2-15.5 LF-MCG/0.5 injection; Inject 0.5 mLs into the muscle once for 1 dose, Disp-0.5 mL, R-0Normal  3. Deviated nasal septum  -     mometasone (NASONEX) 50 MCG/ACT nasal spray; 2 sprays by Each Nostril route daily, Each Nostril, DAILY Starting Thu 2023, Disp-2 each, R-5, Normal  4. Allergic rhinitis, unspecified seasonality, unspecified trigger  -     mometasone (NASONEX) 50 MCG/ACT nasal spray; 2 sprays by Each Nostril route daily, Each Nostril, DAILY Starting Thu 2023, Disp-2 each, R-5, Normal  5. Dyspepsia  -     [address] [facility]  1. Elevated triglycerides. Much improved with taking omega-3 fatty acids. 2 patient given prescription for Tdap vaccine. 3.  Deviated nasal septum. Patient just looking for time to have surgery done. 4.  Allergic rhinitis. Nasonex refilled. 5.  Recurrent dyspepsia. Appears to be consistent with irritable bowel. Will refer to gastroenterology for evaluation. Return in about 6 months (around 2024) for ffup for cpe with labs prior. Subjective   SUBJECTIVE/OBJECTIVE:  29-year-old male comes in for 6 month ffup  Patient complains of  1. Rash on legs no itching. Eczematous rash on lower extremities with scaling. improved with steroid cream.  2.  Psoriasis. Requesting cream for this. Bothers every couple of months. No lesion present time. 3.  History of bipolar depression. Doing well at the present time. Not on any medications. 4.  History of alcohol and drug abuse. Hx of opiates and alcohol abuse. Not drinking, no drugs. Exercising is his vice now. May become a  because he is now interested in this. 5. Complains of stomach issues.  Flare up with

## 2023-10-11 ENCOUNTER — PATIENT MESSAGE (OUTPATIENT)
Dept: ENT CLINIC | Age: 33
End: 2023-10-11

## 2023-10-16 NOTE — TELEPHONE ENCOUNTER
Patient was left VM on 10/1 and 10/2 to schedule an in office appt prior to scheduling surgery. Attempted to reach patient today 10/16. Patient to call when ready to schedule.

## 2024-02-28 ENCOUNTER — OFFICE VISIT (OUTPATIENT)
Dept: ENT CLINIC | Age: 34
End: 2024-02-28
Payer: COMMERCIAL

## 2024-02-28 VITALS
SYSTOLIC BLOOD PRESSURE: 110 MMHG | DIASTOLIC BLOOD PRESSURE: 72 MMHG | WEIGHT: 157 LBS | HEIGHT: 67 IN | BODY MASS INDEX: 24.64 KG/M2 | RESPIRATION RATE: 17 BRPM

## 2024-02-28 DIAGNOSIS — J34.89 NASAL MUCOSA DRY: ICD-10-CM

## 2024-02-28 DIAGNOSIS — J34.89 NASAL OBSTRUCTION: Primary | ICD-10-CM

## 2024-02-28 DIAGNOSIS — J34.2 DEVIATED NASAL SEPTUM: ICD-10-CM

## 2024-02-28 DIAGNOSIS — J34.3 NASAL TURBINATE HYPERTROPHY: ICD-10-CM

## 2024-02-28 PROCEDURE — 99213 OFFICE O/P EST LOW 20 MIN: CPT | Performed by: STUDENT IN AN ORGANIZED HEALTH CARE EDUCATION/TRAINING PROGRAM

## 2024-02-28 NOTE — PROGRESS NOTES
HPI:  Marcelino Norwood is a 33 y.o. male seen Established   Chief Complaint   Patient presents with    Follow-up     Patient today to discuss surgery for septum deviation .      33-year-old male seen as a follow-up evaluation with ongoing concerns nasal airway obstruction.  This has been a long-term problem and we initially met 1 year ago.  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  Approximately 12 years ago he took a blow with an elbow to his right side of his nose and since then he has had ongoing severe left-sided nasal airway obstruction.  This causes concerns with daily activities exercise and sleep.  He has had long-term snoring.  He gets a mild improvement with his long-term saline misting sprays allergy sprays.  He has had less epistaxis over the last year.  Continues with mometasone daily.  Today he desires more information for surgical plans to help with his nasal airway.    Past Medical History, Past Surgical History, Family history, Social History, and Medications were all reviewed with the patient today and updated as necessary.     No Known Allergies    Patient Active Problem List   Diagnosis    Tongue lesion    Chronic diarrhea    Hemorrhoids    Hypertriglyceridemia    Postprandial RUQ pain    Deviated nasal septum    Allergic rhinitis       Current Outpatient Medications   Medication Sig    mometasone (NASONEX) 50 MCG/ACT nasal spray 2 sprays by Each Nostril route daily    hydrocortisone (ANUSOL-HC) 2.5 % CREA rectal cream Place rectally 2 times daily    Cetirizine-Pseudoephedrine (ZYRTEC-D PO) Take by mouth    albuterol sulfate HFA (PROVENTIL;VENTOLIN;PROAIR) 108 (90 Base) MCG/ACT inhaler INHALE 2 PUFFS EVERY 4 HOURS AS NEEDED FOR WHEEZING OR SHORTNESS OF BREATH    augmented betamethasone dipropionate (DIPROLENE AF) 0.05 % cream Apply topically 2 times daily Apply topically 2 times daily.     No current facility-administered medications for this visit.       Past Medical History:

## 2024-03-06 ENCOUNTER — NURSE ONLY (OUTPATIENT)
Dept: INTERNAL MEDICINE CLINIC | Facility: CLINIC | Age: 34
End: 2024-03-06

## 2024-03-06 DIAGNOSIS — E78.1 HYPERTRIGLYCERIDEMIA: ICD-10-CM

## 2024-03-06 LAB
ALBUMIN SERPL-MCNC: 3.7 G/DL (ref 3.5–5)
ALBUMIN/GLOB SERPL: 1.3 (ref 0.4–1.6)
ALP SERPL-CCNC: 138 U/L (ref 50–136)
ALT SERPL-CCNC: 61 U/L (ref 12–65)
ANION GAP SERPL CALC-SCNC: 5 MMOL/L (ref 2–11)
AST SERPL-CCNC: 27 U/L (ref 15–37)
BILIRUB SERPL-MCNC: 0.4 MG/DL (ref 0.2–1.1)
BUN SERPL-MCNC: 24 MG/DL (ref 6–23)
CALCIUM SERPL-MCNC: 9.3 MG/DL (ref 8.3–10.4)
CHLORIDE SERPL-SCNC: 111 MMOL/L (ref 103–113)
CHOLEST SERPL-MCNC: 206 MG/DL
CO2 SERPL-SCNC: 27 MMOL/L (ref 21–32)
CREAT SERPL-MCNC: 1 MG/DL (ref 0.8–1.5)
GLOBULIN SER CALC-MCNC: 2.9 G/DL (ref 2.8–4.5)
GLUCOSE SERPL-MCNC: 94 MG/DL (ref 65–100)
HDLC SERPL-MCNC: 53 MG/DL (ref 40–60)
HDLC SERPL: 3.9
LDLC SERPL CALC-MCNC: 141.6 MG/DL
POTASSIUM SERPL-SCNC: 4.2 MMOL/L (ref 3.5–5.1)
PROT SERPL-MCNC: 6.6 G/DL (ref 6.3–8.2)
SODIUM SERPL-SCNC: 143 MMOL/L (ref 136–146)
TRIGL SERPL-MCNC: 57 MG/DL (ref 35–150)
VLDLC SERPL CALC-MCNC: 11.4 MG/DL (ref 6–23)

## 2024-03-12 NOTE — PROGRESS NOTES
Well Adult Note  Name: Macrelino Norwood Today’s Date: 3/13/2024   MRN: [id number] Sex: Male   Age: 33 y.o. Ethnicity: Non- / Non    : 1990 Race: White (non-)      Marcelino Norwood is here for well adult exam.  History:  33-year-old male comes in for cpe  Patient complains of  1.  Hyperlipidemia.  .  Not following diet because had lost weight so trying to bulk up. Will follow diet more closely.  2.  Psoriasis.  Requesting cream for this. Bothers every couple of months.  No lesion present time. Doing well   3.  History of bipolar depression.  Doing well at the present time.  Not on any medications.  4.  History of alcohol and drug abuse.Hx of opiates and alcohol abuse. Not drinking, no drugs. Exercising is his vice now.  Trying to become a .  Will drug free 1 year I 2024.   5. Complains of stomach issues. Flare up with hemorrhoids with bloating and diarrhea. Occurs once a month.   6.   Hx of deviated nasal septum. Always sneezing and coughing.  Patient taking Zyrtec and Flonase nasal spray.  Has some bleeding noted.  We will change Flonase to Nasonex.  Patient seen by ENT and advised corrective surgery.  Patient is looking for time to have this done.  7.  Elevated triglycerides.  Repeat triglycerides in the normal level.  Taking omega-3 fatty acid. Remains normal.  8. Lab work done 22 stool test essentially normal.  Urinalysis normal.  HIV negative CBC normal CMP essentially normal cholesterol 141 triglycerides 159 LDL 61 HDL 40 TSH normal hemoglobin A1c normal hepatitis C negative  Lab work done 1/3/2023 cholesterol 208 LDL 94 triglycerides 257  GFR normal urinalysis normal  Lab work done 2023 TSH normal  sodium 144 GFR normal LFT normal CBC normal urinalysis normal  Lab work done 3/6/2024 cholesterol 206  GFR normal glucose 94    Nurse Only on 2024   Component Date Value Ref Range Status    Cholesterol, Total 2024

## 2024-03-13 ENCOUNTER — OFFICE VISIT (OUTPATIENT)
Dept: INTERNAL MEDICINE CLINIC | Facility: CLINIC | Age: 34
End: 2024-03-13
Payer: COMMERCIAL

## 2024-03-13 VITALS
WEIGHT: 159 LBS | DIASTOLIC BLOOD PRESSURE: 78 MMHG | HEIGHT: 67 IN | OXYGEN SATURATION: 98 % | BODY MASS INDEX: 24.96 KG/M2 | HEART RATE: 74 BPM | SYSTOLIC BLOOD PRESSURE: 115 MMHG | TEMPERATURE: 98.1 F

## 2024-03-13 DIAGNOSIS — E78.00 PURE HYPERCHOLESTEROLEMIA: ICD-10-CM

## 2024-03-13 DIAGNOSIS — J34.2 DEVIATED NASAL SEPTUM: ICD-10-CM

## 2024-03-13 DIAGNOSIS — J30.9 ALLERGIC RHINITIS, UNSPECIFIED SEASONALITY, UNSPECIFIED TRIGGER: ICD-10-CM

## 2024-03-13 DIAGNOSIS — Z00.00 ENCOUNTER FOR WELL ADULT EXAM WITHOUT ABNORMAL FINDINGS: Primary | ICD-10-CM

## 2024-03-13 PROBLEM — K14.8 TONGUE LESION: Status: RESOLVED | Noted: 2021-10-22 | Resolved: 2024-03-13

## 2024-03-13 PROBLEM — E78.1 HYPERTRIGLYCERIDEMIA: Status: RESOLVED | Noted: 2022-07-13 | Resolved: 2024-03-13

## 2024-03-13 PROCEDURE — 99395 PREV VISIT EST AGE 18-39: CPT | Performed by: FAMILY MEDICINE

## 2024-04-12 DIAGNOSIS — J34.3 NASAL TURBINATE HYPERTROPHY: ICD-10-CM

## 2024-04-12 DIAGNOSIS — J34.89 NASAL MUCOSA DRY: ICD-10-CM

## 2024-04-12 DIAGNOSIS — J34.2 DEVIATED NASAL SEPTUM: ICD-10-CM

## 2024-04-12 DIAGNOSIS — J34.89 NASAL OBSTRUCTION: Primary | ICD-10-CM

## 2024-04-15 RX ORDER — CEFUROXIME AXETIL 250 MG/1
250 TABLET ORAL 2 TIMES DAILY
Qty: 10 TABLET | Refills: 0 | Status: SHIPPED | OUTPATIENT
Start: 2024-04-15 | End: 2024-04-20

## 2024-04-15 RX ORDER — HYDROCODONE BITARTRATE AND ACETAMINOPHEN 5; 325 MG/1; MG/1
1 TABLET ORAL EVERY 4 HOURS PRN
Qty: 30 TABLET | Refills: 0 | Status: SHIPPED | OUTPATIENT
Start: 2024-04-15 | End: 2024-04-20

## 2024-04-15 RX ORDER — ONDANSETRON 4 MG/1
4 TABLET, FILM COATED ORAL 3 TIMES DAILY PRN
Qty: 15 TABLET | Refills: 0 | Status: SHIPPED | OUTPATIENT
Start: 2024-04-15 | End: 2024-04-20

## 2024-04-22 ENCOUNTER — OFFICE VISIT (OUTPATIENT)
Dept: ENT CLINIC | Age: 34
End: 2024-04-22

## 2024-04-22 DIAGNOSIS — Z98.890 S/P NASAL SEPTOPLASTY: Primary | ICD-10-CM

## 2024-04-22 DIAGNOSIS — J34.89 NASAL OBSTRUCTION: ICD-10-CM

## 2024-04-22 PROCEDURE — 99024 POSTOP FOLLOW-UP VISIT: CPT | Performed by: STUDENT IN AN ORGANIZED HEALTH CARE EDUCATION/TRAINING PROGRAM

## 2024-04-22 ASSESSMENT — ENCOUNTER SYMPTOMS
CHOKING: 0
APNEA: 0
EYE DISCHARGE: 0
SINUS PRESSURE: 0
SINUS PAIN: 0
COUGH: 0
CONSTIPATION: 0
WHEEZING: 0
FACIAL SWELLING: 0
EYE PAIN: 0
STRIDOR: 0
SHORTNESS OF BREATH: 0
DIARRHEA: 0
EYE ITCHING: 0

## 2024-04-22 NOTE — PROGRESS NOTES
HPI:  Marcelino Norwood is a 33 y.o. male seen Established   Chief Complaint   Patient presents with    Post-Op Check     Post op f/u of Septo/Turbs done 4/16 @ [facility] .      33-year-old male seen for a follow-up evaluation status post septoplasty/turbinate reduction 4/16/2024.  He is here for his initial 1 week postoperative evaluation.  He has done well with saline use pain has been adequately controlled and he has had no significant bleeding for the last 2 to 3 days.  He has already noted some improvement in regards to her nasal airway and chronic left-sided ears symptoms.    Past Medical History, Past Surgical History, Family history, Social History, and Medications were all reviewed with the patient today and updated as necessary.     No Known Allergies    Patient Active Problem List   Diagnosis    Chronic diarrhea    Hemorrhoids    Postprandial RUQ pain    Deviated nasal septum    Allergic rhinitis    Pure hypercholesterolemia       Current Outpatient Medications   Medication Sig    mometasone (NASONEX) 50 MCG/ACT nasal spray 2 sprays by Each Nostril route daily    hydrocortisone (ANUSOL-HC) 2.5 % CREA rectal cream Place rectally 2 times daily    Cetirizine-Pseudoephedrine (ZYRTEC-D PO) Take by mouth    albuterol sulfate HFA (PROVENTIL;VENTOLIN;PROAIR) 108 (90 Base) MCG/ACT inhaler INHALE 2 PUFFS EVERY 4 HOURS AS NEEDED FOR WHEEZING OR SHORTNESS OF BREATH    augmented betamethasone dipropionate (DIPROLENE AF) 0.05 % cream Apply topically 2 times daily Apply topically 2 times daily.     No current facility-administered medications for this visit.       Past Medical History:   Diagnosis Date    Alcohol abuse     Asthma     Depression     Drug abuse (HCC)     Fracture of nasal bone     Hypertriglyceridemia 07/13/2022    Nosebleed     Psychiatric disorder     Rash        Past Surgical History:   Procedure Laterality Date    HERNIA REPAIR      TESTICLE SURGERY         Social History     Tobacco Use    Smoking status: